# Patient Record
Sex: FEMALE | Race: BLACK OR AFRICAN AMERICAN | NOT HISPANIC OR LATINO | Employment: FULL TIME | URBAN - METROPOLITAN AREA
[De-identification: names, ages, dates, MRNs, and addresses within clinical notes are randomized per-mention and may not be internally consistent; named-entity substitution may affect disease eponyms.]

---

## 2019-05-10 PROBLEM — M17.11 OSTEOARTHRITIS OF RIGHT KNEE: Status: ACTIVE | Noted: 2019-05-10

## 2019-07-05 PROBLEM — Z96.651 S/P TOTAL KNEE ARTHROPLASTY, RIGHT: Status: ACTIVE | Noted: 2019-07-05

## 2021-02-05 PROBLEM — Z96.641 STATUS POST TOTAL REPLACEMENT OF RIGHT HIP: Status: ACTIVE | Noted: 2021-02-05

## 2021-04-08 ENCOUNTER — HISTORICAL (OUTPATIENT)
Dept: ADMINISTRATIVE | Facility: HOSPITAL | Age: 66
End: 2021-04-08

## 2021-05-04 PROBLEM — M16.12 OSTEOARTHRITIS OF LEFT HIP: Status: ACTIVE | Noted: 2021-05-04

## 2022-04-10 ENCOUNTER — HISTORICAL (OUTPATIENT)
Dept: ADMINISTRATIVE | Facility: HOSPITAL | Age: 67
End: 2022-04-10
Payer: MEDICARE

## 2022-04-26 VITALS
DIASTOLIC BLOOD PRESSURE: 75 MMHG | WEIGHT: 213.88 LBS | SYSTOLIC BLOOD PRESSURE: 112 MMHG | OXYGEN SATURATION: 99 % | HEIGHT: 65 IN | BODY MASS INDEX: 35.63 KG/M2

## 2022-11-22 ENCOUNTER — OFFICE VISIT (OUTPATIENT)
Dept: OTOLARYNGOLOGY | Facility: CLINIC | Age: 67
End: 2022-11-22
Payer: MEDICARE

## 2022-11-22 VITALS
DIASTOLIC BLOOD PRESSURE: 87 MMHG | WEIGHT: 219 LBS | TEMPERATURE: 98 F | BODY MASS INDEX: 36.49 KG/M2 | SYSTOLIC BLOOD PRESSURE: 132 MMHG | HEART RATE: 71 BPM

## 2022-11-22 DIAGNOSIS — K21.9 LARYNGOPHARYNGEAL REFLUX (LPR): ICD-10-CM

## 2022-11-22 DIAGNOSIS — H90.3 SENSORINEURAL HEARING LOSS (SNHL) OF BOTH EARS: Primary | ICD-10-CM

## 2022-11-22 DIAGNOSIS — K21.9 GASTROESOPHAGEAL REFLUX DISEASE WITHOUT ESOPHAGITIS: ICD-10-CM

## 2022-11-22 DIAGNOSIS — J32.9 CHRONIC RHINOSINUSITIS: ICD-10-CM

## 2022-11-22 PROCEDURE — 99215 OFFICE O/P EST HI 40 MIN: CPT | Mod: PBBFAC | Performed by: STUDENT IN AN ORGANIZED HEALTH CARE EDUCATION/TRAINING PROGRAM

## 2022-11-22 RX ORDER — CYCLOBENZAPRINE HCL 10 MG
TABLET ORAL
COMMUNITY
Start: 2022-03-20

## 2022-11-22 RX ORDER — AMLODIPINE BESYLATE 5 MG/1
5 TABLET ORAL
COMMUNITY
Start: 2021-11-23

## 2022-11-22 RX ORDER — ROSUVASTATIN CALCIUM 5 MG/1
5 TABLET, COATED ORAL
COMMUNITY
Start: 2021-11-23

## 2022-11-22 RX ORDER — FAMOTIDINE 20 MG/1
20 TABLET, FILM COATED ORAL
COMMUNITY
Start: 2021-11-23

## 2022-11-22 RX ORDER — MELOXICAM 15 MG/1
TABLET ORAL
COMMUNITY
Start: 2022-03-20

## 2022-11-22 RX ORDER — INDOMETHACIN 75 MG/1
CAPSULE, EXTENDED RELEASE ORAL
COMMUNITY
Start: 2022-06-02

## 2022-11-22 RX ORDER — FLUTICASONE PROPIONATE 50 MCG
SPRAY, SUSPENSION (ML) NASAL
COMMUNITY
Start: 2021-11-23

## 2022-11-22 RX ORDER — METHOCARBAMOL 750 MG/1
TABLET, FILM COATED ORAL
COMMUNITY
Start: 2022-06-02

## 2022-11-22 RX ORDER — CETIRIZINE HYDROCHLORIDE 10 MG/1
10 TABLET, CHEWABLE ORAL
COMMUNITY
Start: 2021-11-23

## 2022-11-22 RX ORDER — CYCLOSPORINE 0.5 MG/ML
1 EMULSION OPHTHALMIC
COMMUNITY
Start: 2022-02-08

## 2022-11-22 NOTE — PROGRESS NOTES
[facility]  Otolaryngology Clinic Note    Ashlee Bobo  Encounter Date: 11/22/2022  YOB: 1955  Physician: JOON Patterson MD    Chief Complaint: follow up    HPI:   12/18/20: 65yoF referred for epistaxis. She reports a dry, itchy nose. Unsure if bleeding is unilateral. This began over the summer when she had to have nasal packing placed twice in the ED in [place]. Unable to say how often epistaxis is occurring but that is still is every now and then. Recent nasal congestion, rhinorrhea, PND, frontal and nasal pain/pressure, dysgeusia, sneezing, and itchy/watery eyes which began this month. Has taken claritin daily for a long time which she does not find helpful, would like to try a different antihistamine. On eliquis for DVTs that occurred about a year ago. Is using two OTC nasal sprays but she is unsure what they are.    2/9/21: Concerned about all symptoms bc of her lupus. C/o persistent nasal congestion and pressure. Continues to have dryness and itching of nasal passages and ears. Saline gel helped but did not last long enough; admits she was not using it as prescribed. Uses wash cloth to clean ears and frequently scratches them with her nails. Does not like flonase bc she feels that it makes her nose run. Allegra was too expensive but is not interested in a discount card or using a different pharmacy, states she will discuss with PCP. Denies epistaxis, just occasional spots of blood here and there. Had TIA a few weeks back. Has been feeling off balance occasionally since before the TIA, but denies spinning. Denies exacerbating or relieving factors, but quick head turns when dizzy intensify the sensation. Also c/o decreased hearing b/l, not sure how long but feels as though it has been progressing for a long time. Intermittent tinnitus R>L. [1]    April 8, 2021:  65-year-old female presents today for follow-up of her chronic/allergic rhinitis, subjective  hearing loss, and itching of her ears.  In regards to her chronic rhinitis she is using fluticasone nasal spray, Astelin nasal spray, saline irrigations, and AYR nasal gel which does seem to be helpful with her nasal congestion and dryness. She feels she is getting [employer] which helps but makes her very dry relief with these medications.  In regards to her itching ear she is using the Derm otic as prescribed and it is helpful with the itching.  She did have an audiogram done earlier today for evaluation of her hearing loss and it did showed mild sensorineural hearing loss bilaterally with speech reception threshold of 15 dB bilaterally and 88% discrimination in the right ear and 84% in the left ear. Type A tympanograms bilaterally. Results have been discussed with the patient. She had indicated that she would be interested in hearing aids even though the hearing loss is only mild. She had indicated that she does have some difficulty following conversations.  She does not have other new problems today.    11/23/21: Here for follow up. Patient reports she is not doing well overall and has had over a month of thick rhinorrhea, PND, facial pressure/pain over maxillary sinuses that is not going away. She does admit that she is only using Flonase sporadically and not much else because she ran out. Endorses itchy/watery eyes and sneezing but is not on allergy medication. Also reports frequent throat clearing. Has GERD for which she takes PPI, but still has breakthrough reflux.    2/8/22: Patient here today for follow-up. She says she still has intermittently itchy ears. She feels like her voice is only hoarse or scratchy. She still has some drainage from back of her nose. She says that she ran out of her sprays and then did buy something at [employer] which helps but makes her very dry.    11/22/2022:  Patient is here today for follow up.  She is having some nasal discharge but this is mainly clear.  No change to the voice,  she has had some itchy ears and dry skin over her face.  She does not use humidifier at home.  She is not used mineral over ears.  No changes to her hearing.  States her GERD is little bit better controlled.  No other issues.    ROS:   General: Negative except per HPI  Skin: Denies rash, ulcer, or lesion.  Eyes: Denies vision changes or diplopia.  Ears: Negative except per HPI  Nose: Negative except per HPI  Throat/mouth: Negative except per HPI  Cardiovascular: Negative except per HPI  Respiratory: Negative except per HPI  Neck: Negative except per HPI  Endocrine: Negative except per HPI  Neurologic: Negative except per HPI    Other 10-point review of systems negative except per HPI      Review of patient's allergies indicates:   Allergen Reactions    Barium sulfate (bulk) Nausea And Vomiting       Past Medical History:   Diagnosis Date    DVT (deep venous thrombosis)     GERD (gastroesophageal reflux disease)     Hypertension     Myocardial infarction     Osteoarthritis of right knee 5/10/2019    Systemic lupus erythematosus     TIA (transient ischemic attack)        Past Surgical History:   Procedure Laterality Date    APPENDECTOMY      DILATION AND CURETTAGE OF UTERUS      HIATAL HERNIA REPAIR      TONSILLECTOMY      TOTAL ABDOMINAL HYSTERECTOMY W/ BILATERAL SALPINGOOPHORECTOMY      TOTAL HIP ARTHROPLASTY Right 2018    Dr. Molina    TOTAL KNEE ARTHROPLASTY Right 2019    Dr. Molina       Social History     Socioeconomic History    Marital status: Legally    Tobacco Use    Smoking status: Never    Smokeless tobacco: Never   Substance and Sexual Activity    Alcohol use: Not Currently    Drug use: Never    Sexual activity: Not Currently     Partners: Male       Family History   Family history unknown: Yes       Outpatient Encounter Medications as of 11/22/2022   Medication Sig Dispense Refill    amLODIPine (NORVASC) 10 MG tablet Take 10 mg by mouth.      apixaban (ELIQUIS) 5 mg Tab Take 5 mg by mouth.       atorvastatin (LIPITOR) 40 MG tablet       busPIRone (BUSPAR) 10 MG tablet       cyclobenzaprine (FLEXERIL) 10 MG tablet       cyclobenzaprine (FLEXERIL) 10 MG tablet       cycloSPORINE (RESTASIS MULTIDOSE) 0.05 % Drop 1 drop.      DULoxetine (CYMBALTA) 30 MG capsule       esomeprazole (NEXIUM) 40 MG capsule Take 40 mg by mouth.      famotidine (PEPCID) 20 MG tablet Take 20 mg by mouth.      fluticasone propionate (FLONASE) 50 mcg/actuation nasal spray USE 2 SPRAY(S) IN EACH NOSTRIL TWICE DAILY      fluticasone propionate (FLONASE) 50 mcg/actuation nasal spray by Nasal route.      hydrOXYchloroQUINE (PLAQUENIL) 200 mg tablet       indomethacin (INDOCIN SR) 75 mg CpSR CR capsule       linaCLOtide (LINZESS) 72 mcg Cap capsule Take 72 mcg by mouth.      LINZESS 145 mcg Cap capsule Take 145 mcg by mouth once daily.      loratadine (CLARITIN) 10 mg tablet Take 10 mg by mouth.      meloxicam (MOBIC) 15 MG tablet       methocarbamoL (ROBAXIN) 750 MG Tab       metoprolol tartrate (LOPRESSOR) 50 MG tablet       rosuvastatin (CRESTOR) 5 MG tablet Take 5 mg by mouth.      amitriptyline (ELAVIL) 25 MG tablet       amLODIPine (NORVASC) 5 MG tablet Take 5 mg by mouth.      cetirizine 10 mg chewable tablet Take 10 mg by mouth.      pregabalin (LYRICA) 75 MG capsule Take 75 mg by mouth 3 (three) times daily.       No facility-administered encounter medications on file as of 11/22/2022.       Physical Exam:  Vitals:    11/22/22 1012   BP: 132/87   Pulse: 71   Temp: 98.3 °F (36.8 °C)   TempSrc: Oral   Weight: 99.3 kg (219 lb)       GEN: NAD, resting comfortably  Head/Face: NC, AT  Eyes: EOMI, PERRLA  Ears:  No external deformities   Were bilateral ears with dry skin going into the EAC, EACs clear without cerumen, TM bilaterally intact without middle ear effusion noted  Nose: no external deformities, nasal valves patent, septal deviation to the right, bony spur along the floor to the left, MT on left is visible with some  crusting  Oral: MMM, clear OP, no masses/lesions  Neck: soft, nontender, FROM, no masses/LAD  CV: nontachycardic, distal pulses intact  Pulm: nonlabored, no stridor, symmetric chest rise and fall  Neuro: AAOx3, CN II-XII intact      Pertinent Data:  ? LABS:  ? AUDIO:  Mild sensorineural hearing loss bilaterally  ? CT Scan:      Assessment/Plan:  Ashlee Bobo is a 67 y.o. female CRS without polyposis, mild sensorineural hearing loss, LPR, GERD.    - AYR gel for nasal dryness   - continue Claritin  - normal saline irrigations and Flonase for nasal regimen  - patient to use humidifier in her room while sleeping.  - we will hold off on a CT at this time   -we will order audiogram for 1 year on the same day as her follow up ENT appointment.  -return to clinic 1 year      JOON Patterson MD  [facility] Department of Otolaryngology  [facility]

## 2022-12-07 NOTE — PROGRESS NOTES
I reviewed the history and physical exam with the resident.   I agree with findings and plan.    Cliff Cheney M.D.

## 2024-10-24 ENCOUNTER — PATIENT MESSAGE (OUTPATIENT)
Dept: GASTROENTEROLOGY | Facility: CLINIC | Age: 69
End: 2024-10-24
Payer: MEDICARE

## 2025-04-30 ENCOUNTER — HOSPITAL ENCOUNTER (OUTPATIENT)
Dept: RADIOLOGY | Facility: HOSPITAL | Age: 70
Discharge: HOME | End: 2025-04-30
Attending: PHYSICAL MEDICINE & REHABILITATION
Payer: MEDICARE

## 2025-04-30 DIAGNOSIS — S72.351A: ICD-10-CM

## 2025-04-30 PROCEDURE — 73552 X-RAY EXAM OF FEMUR 2/>: CPT | Mod: TC,RT

## 2025-05-09 ENCOUNTER — HOSPITAL ENCOUNTER (OUTPATIENT)
Dept: RADIOLOGY | Facility: HOSPITAL | Age: 70
Discharge: HOME | End: 2025-05-09
Attending: PHYSICAL MEDICINE & REHABILITATION

## 2025-05-09 DIAGNOSIS — I82.409 DVT (DEEP VENOUS THROMBOSIS): ICD-10-CM

## 2025-05-09 PROCEDURE — 93971 EXTREMITY STUDY: CPT | Mod: TC,RT

## 2025-05-15 ENCOUNTER — HOSPITAL ENCOUNTER (EMERGENCY)
Facility: HOSPITAL | Age: 70
Discharge: NURSING, REHAB OR LONG-TERM CARE | End: 2025-05-15
Attending: OTOLARYNGOLOGY
Payer: MEDICARE

## 2025-05-15 VITALS
WEIGHT: 203 LBS | DIASTOLIC BLOOD PRESSURE: 95 MMHG | RESPIRATION RATE: 17 BRPM | TEMPERATURE: 98 F | HEIGHT: 64 IN | HEART RATE: 60 BPM | OXYGEN SATURATION: 97 % | SYSTOLIC BLOOD PRESSURE: 142 MMHG | BODY MASS INDEX: 34.66 KG/M2

## 2025-05-15 DIAGNOSIS — S72.001A CLOSED FRACTURE OF NECK OF RIGHT FEMUR, INITIAL ENCOUNTER: ICD-10-CM

## 2025-05-15 DIAGNOSIS — R07.89 ATYPICAL CHEST PAIN: Primary | ICD-10-CM

## 2025-05-15 DIAGNOSIS — Z91.81 HISTORY OF RECENT FALL: ICD-10-CM

## 2025-05-15 DIAGNOSIS — S72.421D CLOSED BICONDYLAR FRACTURE OF RIGHT FEMUR WITH ROUTINE HEALING, SUBSEQUENT ENCOUNTER: ICD-10-CM

## 2025-05-15 DIAGNOSIS — S72.431D CLOSED BICONDYLAR FRACTURE OF RIGHT FEMUR WITH ROUTINE HEALING, SUBSEQUENT ENCOUNTER: ICD-10-CM

## 2025-05-15 DIAGNOSIS — R07.9 CHEST PAIN: ICD-10-CM

## 2025-05-15 DIAGNOSIS — R07.1 CHEST PAIN ON BREATHING: ICD-10-CM

## 2025-05-15 LAB
ALBUMIN SERPL-MCNC: 3.7 G/DL (ref 3.4–5)
ALBUMIN/GLOB SERPL: 1.1 RATIO
ALP SERPL-CCNC: 158 UNIT/L (ref 50–144)
ALT SERPL-CCNC: 13 UNIT/L (ref 1–45)
ANION GAP SERPL CALC-SCNC: 3 MEQ/L (ref 2–13)
AST SERPL-CCNC: 32 UNIT/L (ref 14–36)
BASOPHILS # BLD AUTO: 0.01 X10(3)/MCL (ref 0.01–0.08)
BASOPHILS NFR BLD AUTO: 0.2 % (ref 0.1–1.2)
BILIRUB SERPL-MCNC: 0.6 MG/DL (ref 0–1)
BNP BLD-MCNC: 158 PG/ML (ref 0–124.9)
BUN SERPL-MCNC: 12 MG/DL (ref 7–20)
CALCIUM SERPL-MCNC: 9.2 MG/DL (ref 8.4–10.2)
CHLORIDE SERPL-SCNC: 113 MMOL/L (ref 98–110)
CO2 SERPL-SCNC: 27 MMOL/L (ref 21–32)
CREAT SERPL-MCNC: 0.53 MG/DL (ref 0.66–1.25)
CREAT/UREA NIT SERPL: 23 (ref 12–20)
D DIMER PPP IA.FEU-MCNC: 2.1 MG/L FEU (ref 0.19–0.5)
EOSINOPHIL # BLD AUTO: 0.07 X10(3)/MCL (ref 0.04–0.36)
EOSINOPHIL NFR BLD AUTO: 1.7 % (ref 0.7–7)
ERYTHROCYTE [DISTWIDTH] IN BLOOD BY AUTOMATED COUNT: 14.6 % (ref 11–14.5)
GFR SERPLBLD CREATININE-BSD FMLA CKD-EPI: >90 ML/MIN/1.73/M2
GLOBULIN SER-MCNC: 3.5 GM/DL (ref 2–3.9)
GLUCOSE SERPL-MCNC: 86 MG/DL (ref 70–115)
HCT VFR BLD AUTO: 34.4 % (ref 36–48)
HGB BLD-MCNC: 11.2 G/DL (ref 11.8–16)
IMM GRANULOCYTES # BLD AUTO: 0.01 X10(3)/MCL (ref 0–0.03)
IMM GRANULOCYTES NFR BLD AUTO: 0.2 % (ref 0–0.5)
LYMPHOCYTES # BLD AUTO: 1.84 X10(3)/MCL (ref 1.16–3.74)
LYMPHOCYTES NFR BLD AUTO: 45.1 % (ref 20–55)
MCH RBC QN AUTO: 32 PG (ref 27–34)
MCHC RBC AUTO-ENTMCNC: 32.6 G/DL (ref 31–37)
MCV RBC AUTO: 98.3 FL (ref 79–99)
MONOCYTES # BLD AUTO: 0.44 X10(3)/MCL (ref 0.24–0.36)
MONOCYTES NFR BLD AUTO: 10.8 % (ref 4.7–12.5)
NEUTROPHILS # BLD AUTO: 1.71 X10(3)/MCL (ref 1.56–6.13)
NEUTROPHILS NFR BLD AUTO: 42 % (ref 37–73)
NRBC BLD AUTO-RTO: 0 %
OHS QRS DURATION: 96 MS
OHS QTC CALCULATION: 439 MS
PLATELET # BLD AUTO: 239 X10(3)/MCL (ref 140–371)
PMV BLD AUTO: 9.7 FL (ref 9.4–12.4)
POTASSIUM SERPL-SCNC: 3.5 MMOL/L (ref 3.5–5.1)
PROT SERPL-MCNC: 7.2 GM/DL (ref 6.3–8.2)
RBC # BLD AUTO: 3.5 X10(6)/MCL (ref 4–5.1)
SODIUM SERPL-SCNC: 143 MMOL/L (ref 136–145)
TROPONIN I SERPL-MCNC: <0.012 NG/ML (ref 0–0.03)
TROPONIN I SERPL-MCNC: <0.012 NG/ML (ref 0–0.03)
WBC # BLD AUTO: 4.08 X10(3)/MCL (ref 4–11.5)

## 2025-05-15 PROCEDURE — 99285 EMERGENCY DEPT VISIT HI MDM: CPT | Mod: 25

## 2025-05-15 PROCEDURE — 83880 ASSAY OF NATRIURETIC PEPTIDE: CPT | Performed by: OTOLARYNGOLOGY

## 2025-05-15 PROCEDURE — 96375 TX/PRO/DX INJ NEW DRUG ADDON: CPT

## 2025-05-15 PROCEDURE — 25500020 PHARM REV CODE 255: Performed by: OTOLARYNGOLOGY

## 2025-05-15 PROCEDURE — 96374 THER/PROPH/DIAG INJ IV PUSH: CPT

## 2025-05-15 PROCEDURE — 93010 ELECTROCARDIOGRAM REPORT: CPT | Mod: ,,, | Performed by: INTERNAL MEDICINE

## 2025-05-15 PROCEDURE — 80053 COMPREHEN METABOLIC PANEL: CPT | Performed by: OTOLARYNGOLOGY

## 2025-05-15 PROCEDURE — 25000003 PHARM REV CODE 250: Performed by: OTOLARYNGOLOGY

## 2025-05-15 PROCEDURE — 93005 ELECTROCARDIOGRAM TRACING: CPT

## 2025-05-15 PROCEDURE — 84484 ASSAY OF TROPONIN QUANT: CPT | Performed by: OTOLARYNGOLOGY

## 2025-05-15 PROCEDURE — 63600175 PHARM REV CODE 636 W HCPCS: Mod: JZ,TB | Performed by: OTOLARYNGOLOGY

## 2025-05-15 PROCEDURE — 85379 FIBRIN DEGRADATION QUANT: CPT | Performed by: OTOLARYNGOLOGY

## 2025-05-15 PROCEDURE — 85025 COMPLETE CBC W/AUTO DIFF WBC: CPT | Performed by: OTOLARYNGOLOGY

## 2025-05-15 RX ORDER — POTASSIUM CHLORIDE 600 MG/1
8 TABLET, EXTENDED RELEASE ORAL ONCE
COMMUNITY

## 2025-05-15 RX ORDER — ONDANSETRON HYDROCHLORIDE 2 MG/ML
8 INJECTION, SOLUTION INTRAVENOUS
Status: COMPLETED | OUTPATIENT
Start: 2025-05-15 | End: 2025-05-15

## 2025-05-15 RX ORDER — DIPHENHYDRAMINE HYDROCHLORIDE 50 MG/ML
50 INJECTION, SOLUTION INTRAMUSCULAR; INTRAVENOUS
Status: DISCONTINUED | OUTPATIENT
Start: 2025-05-15 | End: 2025-05-15

## 2025-05-15 RX ORDER — DEXAMETHASONE SODIUM PHOSPHATE 4 MG/ML
8 INJECTION, SOLUTION INTRA-ARTICULAR; INTRALESIONAL; INTRAMUSCULAR; INTRAVENOUS; SOFT TISSUE
Status: DISCONTINUED | OUTPATIENT
Start: 2025-05-15 | End: 2025-05-15

## 2025-05-15 RX ORDER — HYDROCODONE BITARTRATE AND ACETAMINOPHEN 5; 325 MG/1; MG/1
1 TABLET ORAL
Refills: 0 | Status: COMPLETED | OUTPATIENT
Start: 2025-05-15 | End: 2025-05-15

## 2025-05-15 RX ORDER — PANTOPRAZOLE SODIUM 40 MG/1
40 TABLET, DELAYED RELEASE ORAL DAILY
COMMUNITY

## 2025-05-15 RX ORDER — PROMETHAZINE HYDROCHLORIDE 25 MG/1
25 SUPPOSITORY RECTAL EVERY 4 HOURS PRN
COMMUNITY

## 2025-05-15 RX ORDER — FAMOTIDINE 10 MG/ML
40 INJECTION, SOLUTION INTRAVENOUS
Status: COMPLETED | OUTPATIENT
Start: 2025-05-15 | End: 2025-05-15

## 2025-05-15 RX ORDER — DIPHENHYDRAMINE HYDROCHLORIDE 50 MG/ML
50 INJECTION, SOLUTION INTRAMUSCULAR; INTRAVENOUS
Status: COMPLETED | OUTPATIENT
Start: 2025-05-15 | End: 2025-05-15

## 2025-05-15 RX ORDER — DEXAMETHASONE SODIUM PHOSPHATE 4 MG/ML
8 INJECTION, SOLUTION INTRA-ARTICULAR; INTRALESIONAL; INTRAMUSCULAR; INTRAVENOUS; SOFT TISSUE
Status: COMPLETED | OUTPATIENT
Start: 2025-05-15 | End: 2025-05-15

## 2025-05-15 RX ORDER — KETOROLAC TROMETHAMINE 30 MG/ML
15 INJECTION, SOLUTION INTRAMUSCULAR; INTRAVENOUS
Status: COMPLETED | OUTPATIENT
Start: 2025-05-15 | End: 2025-05-15

## 2025-05-15 RX ORDER — IBUPROFEN/PSEUDOEPHEDRINE HCL 200MG-30MG
3 TABLET ORAL NIGHTLY
COMMUNITY

## 2025-05-15 RX ORDER — HYDROCODONE BITARTRATE AND ACETAMINOPHEN 5; 325 MG/1; MG/1
1 TABLET ORAL 2 TIMES DAILY
COMMUNITY

## 2025-05-15 RX ADMIN — ONDANSETRON 8 MG: 2 INJECTION INTRAMUSCULAR; INTRAVENOUS at 09:05

## 2025-05-15 RX ADMIN — DEXAMETHASONE SODIUM PHOSPHATE 8 MG: 4 INJECTION, SOLUTION INTRA-ARTICULAR; INTRALESIONAL; INTRAMUSCULAR; INTRAVENOUS; SOFT TISSUE at 09:05

## 2025-05-15 RX ADMIN — FAMOTIDINE 40 MG: 10 INJECTION, SOLUTION INTRAVENOUS at 07:05

## 2025-05-15 RX ADMIN — DIPHENHYDRAMINE HYDROCHLORIDE 50 MG: 50 INJECTION INTRAMUSCULAR; INTRAVENOUS at 09:05

## 2025-05-15 RX ADMIN — PREDNISONE 50 MG: 20 TABLET ORAL at 08:05

## 2025-05-15 RX ADMIN — HYDROCODONE BITARTRATE AND ACETAMINOPHEN 1 TABLET: 5; 325 TABLET ORAL at 11:05

## 2025-05-15 RX ADMIN — IOHEXOL 70 ML: 350 INJECTION, SOLUTION INTRAVENOUS at 09:05

## 2025-05-15 RX ADMIN — KETOROLAC TROMETHAMINE 15 MG: 30 INJECTION, SOLUTION INTRAMUSCULAR at 08:05

## 2025-05-15 NOTE — ED NOTES
[facility] S/P right femur repair approx x2 weeks ago. Pt presents with pain under left breath, worsening with breathing. Pt also states pain from mid lower back to mid back. Pt is AAOx4. No acute distress noted during initial assessment.

## 2025-05-15 NOTE — ED PROVIDER NOTES
Encounter Date: 5/15/2025       History     Chief Complaint   Patient presents with    Chest Pain     [facility], report states PT is having chest pain, 1 nitro given pta. PT C/O left chest pain under breast and mid to lower back pain.     THE PATIENT WAS SENT BY AMBULANCE FROM THE REHAB CENTER FOR EVALUATION OF CHEST PAIN DESCRIBED AS PRESSURE PAIN OVER THE ANTERIOR LOWER LEFT CHEST THAT STARTED AT 5 AM. DENIES RADIATION. POSITIVE FOR SOB. NO NAUSEA OR VOMITING. NO DIARRHEA. NO DIAPHORESIS. THE PATIENT HAS HTN, HYPERLIPIDEMIA AND IS A FORMER SMOKER. NO DIABETES. CARDIAC CATH IS IN PAST DID NOT REVEAL BLOCKAGE ACCORDING TO THE PATIENT. HER MOTHER HAS CHF. SHE IS LYING ON THE GURNEY WITH HER EYES CLOSED AND IS IN NO ACUTE DISTRESS. SHE HAS A SIGNIFICANT HISTORY OF GERD. ONE NTG SL TAB WAS GIVEN TO THE PATIENT PRIOR TO ARRIVAL. THE PATIENT DENIES FALL OR TRAUMA. SHE IS IN REHAB FOR A RIGHT FEMUR FRACTURE AND HAS BEEN THERE 2 WEEKS.        Review of patient's allergies indicates:   Allergen Reactions    Barium sulfate (bulk) Nausea And Vomiting    Iodine     Opioids - morphine analogues      Past Medical History:   Diagnosis Date    CHF (congestive heart failure)     CVA (cerebral vascular accident)     Depression     Diabetes mellitus     DVT (deep venous thrombosis)     Dysphagia     GERD (gastroesophageal reflux disease)     Hypertension     Myocardial infarction     Osteoarthritis of right knee 05/10/2019    Systemic lupus erythematosus     TIA (transient ischemic attack)      Past Surgical History:   Procedure Laterality Date    APPENDECTOMY      CORONARY ARTERY BYPASS GRAFT      DILATION AND CURETTAGE OF UTERUS      HIATAL HERNIA REPAIR      TONSILLECTOMY      TOTAL ABDOMINAL HYSTERECTOMY W/ BILATERAL SALPINGOOPHORECTOMY      TOTAL HIP ARTHROPLASTY Right 2018    Dr. Molina    TOTAL KNEE ARTHROPLASTY Right 2019    Dr. Molina     Family History   Family history unknown: Yes     Social  History[1]  Review of Systems   Respiratory:  Positive for shortness of breath.    Cardiovascular:  Positive for chest pain.   All other systems reviewed and are negative.      Physical Exam     Initial Vitals [05/15/25 0655]   BP Pulse Resp Temp SpO2   (!) 147/99 (!) 52 20 98.5 °F (36.9 °C) 96 %      MAP       --         Physical Exam    Nursing note and vitals reviewed.  Constitutional: She appears well-developed and well-nourished. No distress.   HENT:   Head: Normocephalic and atraumatic.   Eyes: Conjunctivae and EOM are normal. Pupils are equal, round, and reactive to light.   Neck: Neck supple.   Normal range of motion.  Cardiovascular:  Normal rate, regular rhythm and normal heart sounds.           PATIENT IS HAVING PAIN OVER THE LATERAL LOWER ANTERIOR CHEST WALL DIRECTLY BELOW AND IN LINE WITH THE NIPPLE, PATIENT WINCES WITH PALPATION OF THE RIB AREA WHERE SHE POINTS TO THE ORIGIN OF THE PAIN   Pulmonary/Chest: Breath sounds normal.   Abdominal: Abdomen is soft. Bowel sounds are normal.   Musculoskeletal:         General: Normal range of motion.      Cervical back: Normal range of motion and neck supple.     Neurological: She is alert and oriented to person, place, and time. GCS score is 15. GCS eye subscore is 4. GCS verbal subscore is 5. GCS motor subscore is 6.   Skin: Skin is warm and dry.   Psychiatric: She has a normal mood and affect. Her behavior is normal. Judgment and thought content normal.         ED Course   Procedures  Labs Reviewed   COMPREHENSIVE METABOLIC PANEL - Abnormal       Result Value    Sodium 143      Potassium 3.5      Chloride 113 (*)     CO2 27      Glucose 86      Blood Urea Nitrogen 12      Creatinine 0.53 (*)     Calcium 9.2      Protein Total 7.2      Albumin 3.7      Globulin 3.5      Albumin/Globulin Ratio 1.1      Bilirubin Total 0.6       (*)     ALT 13      AST 32      eGFR >90      Anion Gap 3.0      BUN/Creatinine Ratio 23 (*)    NT-PRO NATRIURETIC PEPTIDE -  Abnormal    ProBNP 158.0 (*)    D DIMER, QUANTITATIVE - Abnormal    D-Dimer 2.10 (*)    CBC WITH DIFFERENTIAL - Abnormal    WBC 4.08      RBC 3.50 (*)     Hgb 11.2 (*)     Hct 34.4 (*)     MCV 98.3      MCH 32.0      MCHC 32.6      RDW 14.6 (*)     Platelet 239      MPV 9.7      Neut % 42.0      Lymph % 45.1      Mono % 10.8      Eos % 1.7      Basophil % 0.2      Lymph # 1.84      Neut # 1.71      Mono # 0.44 (*)     Eos # 0.07      Baso # 0.01      Imm Gran # 0.01      Imm Grans % 0.2      NRBC% 0.0     TROPONIN I - Normal    Troponin-I <0.012     TROPONIN I - Normal    Troponin-I <0.012     CBC W/ AUTO DIFFERENTIAL    Narrative:     The following orders were created for panel order CBC auto differential.  Procedure                               Abnormality         Status                     ---------                               -----------         ------                     CBC with Differential[[id number]]       Abnormal            Final result                 Please view results for these tests on the individual orders.     EKG Readings: (Independently Interpreted)   Initial Reading: No STEMI. Rhythm: Normal Sinus Rhythm. Ectopy: No Ectopy. ST Segments: Normal ST Segments. T Waves Flipped: V1. Axis: Normal. Clinical Impression: Normal Sinus Rhythm     ECG Results              EKG 12-lead (Final result)        Collection Time Result Time QRS Duration OHS QTC Calculation    05/15/25 07:02:53 05/15/25 09:14:52 96 439                     Final result by Interface, Lab In [facility] (05/15/25 09:14:58)                   Narrative:    Test Reason : R07.9,    Vent. Rate :  71 BPM     Atrial Rate :  71 BPM     P-R Int : 166 ms          QRS Dur :  96 ms      QT Int : 404 ms       P-R-T Axes :  63 -16  34 degrees    QTcB Int : 439 ms    Normal sinus rhythm  Cannot rule out Anterior infarct ,age undetermined  Abnormal ECG  No previous ECGs available  Confirmed by Edgar Iverson ([id number]) on 5/15/2025 9:14:45 AM    Referred By:  AAAREFERRAL SELF           Confirmed By: Edgar Iverson                                  Imaging Results              CTA Chest Non-Coronary (PE Studies) (Final result)  Result time 05/15/25 09:32:25      Final result by Vitaly Hagan MD (05/15/25 09:32:25)                   Narrative:    EXAMINATION  CTA CHEST NON CORONARY (PE STUDIES)    CLINICAL HISTORY  Pulmonary embolism (PE) suspected, positive D-dimer;  chest pain    TECHNIQUE  Post-contrast helical-acquisition CT images were obtained, with bolus timing to pulmonary arterial system for purposes of PE protocol CTA.  Multiplanar reconstructions, including MIP images, were accomplished by a CT technologist at a separate workstation, pushed to PACS for physician review.    CONTRAST  IV: Omnipaque 350, 70 mL    COMPARISON  None available at the time of initial interpretation.    FINDINGS  Images were reviewed in soft tissue, lung, and bone windows.    Exam quality: adequate for evaluation    Lines/tubes: none visualized    Pulmonary Vessels: No central or large segmental filling defect.    Cardiovascular: No suggestion of right heart strain; the RV:LV ratio is <1. No significant heart chamber enlargement. There is no pericardial effusion. No focal contour irregularity or intraluminal abnormality is appreciated involving the visualized aorta and branch vessels.    Lungs/Pleura: Central airways are patent. No acute airspace consolidation or suspicious focal lesion. Bilateral dependent atelectasis is noted.  No pleural thickening, significant effusion, or evidence of pneumothorax.    Other Findings: No pathologic karen enlargement or necrotic process.  The visualized lower neck tissues and included upper abdominal organs are without acute abnormality or other suspicious focal process.  There are surgical changes of the left upper abdomen.  No abnormality of the thoracic wall soft tissues. No acute osseous displacement or destructive focal lesion.    IMPRESSION  No  central/segmental pulmonary thromboembolism or other acute intrathoracic process.    RADIATION DOSE  Automated tube current modulation, weight-based exposure dosing, and/or iterative reconstruction technique utilized to reach lowest reasonably achievable exposure rate.    DLP: 211.1 mGy*cm      Electronically signed by: Vitaly Hagan  Date:    05/15/2025  Time:    09:32                                     X-Ray Chest AP Portable (Final result)  Result time 05/15/25 07:30:41      Final result by Vitaly Hagan MD (05/15/25 07:30:41)                   Narrative:    EXAMINATION  XR CHEST AP PORTABLE    CLINICAL HISTORY  Chest Pain;    TECHNIQUE  A total of 1 frontal image(s) of the chest.    COMPARISON  14 May 2021    FINDINGS  Lines/tubes/devices: none present    The cardiomediastinal silhouette and central pulmonary vasculature are unremarkable for utilized technique.  The trachea is midline. There is no lobar consolidation, pleural effusion, or pneumothorax.    There is no acute osseous or extrathoracic abnormality.    IMPRESSION  No convincing acute abnormality.      Electronically signed by: Vitaly Hagan  Date:    05/15/2025  Time:    07:30                                     Medications   predniSONE tablet 50 mg (50 mg Oral Given 5/15/25 0851)   famotidine (PF) injection 40 mg (40 mg Intravenous Given 5/15/25 0727)   ketorolac injection 15 mg (15 mg Intravenous Given 5/15/25 0838)   iohexoL (OMNIPAQUE 350) injection 80 mL (70 mLs Intravenous Given 5/15/25 0908)   dexAMETHasone injection 8 mg (8 mg Intravenous Given 5/15/25 0900)   diphenhydrAMINE injection 50 mg (50 mg Intravenous Given 5/15/25 0900)   ondansetron injection 8 mg (8 mg Intravenous Given 5/15/25 0954)     Medical Decision Making  CHEST PAIN; RULE OUT ANGINA, CAD VS COPD VS PE VS PLEURISY VS COSTOCHONDRITIS VS INTERCOSTAL MUSCLE STRAIN VS GERD VS OTHER.    FIRST TROPONIN NEG. D-DIMER ELEVATED AND CTA NEG. WAITING ON 2ND TROPONIN. IF WNL WILL DC  BACK TO REHAB.    Amount and/or Complexity of Data Reviewed  Labs: ordered.  Radiology: ordered.    Risk  Prescription drug management.                                      Clinical Impression:  Final diagnoses:  [R07.9] Chest pain  [R07.1] Chest pain on breathing  [R07.89] Atypical chest pain (Primary)  [Z91.81] History of recent fall  [S72.001A] Closed fracture of neck of right femur, initial encounter  [S72.421D, S72.431D] Closed bicondylar fracture of right femur with routine healing, subsequent encounter          ED Disposition Condition    Discharge Stable          ED Prescriptions    None       Follow-up Information       Follow up With Specialties Details Why Contact Info    Clark Sam MD Internal Medicine Schedule an appointment as soon as possible for a visit  RETURN TO REHAB [address] [facility] [address]  [phone]                   Wandy Conti MD  05/15/25 1110         [1]   Social History  Tobacco Use    Smoking status: Never    Smokeless tobacco: Never   Substance Use Topics    Alcohol use: Not Currently    Drug use: Never        Wandy Conti MD  05/15/25 1111

## 2025-05-21 ENCOUNTER — HOSPITAL ENCOUNTER (OUTPATIENT)
Dept: RADIOLOGY | Facility: HOSPITAL | Age: 70
Discharge: HOME | End: 2025-05-21
Attending: PHYSICAL MEDICINE & REHABILITATION
Payer: MEDICARE

## 2025-05-21 DIAGNOSIS — S72.351A: ICD-10-CM

## 2025-05-21 PROCEDURE — 71046 X-RAY EXAM CHEST 2 VIEWS: CPT | Mod: TC
